# Patient Record
(demographics unavailable — no encounter records)

---

## 2025-03-20 NOTE — HISTORY OF PRESENT ILLNESS
[FreeTextEntry1] : CC: 5 y 8 mo old right handed girl with developmental lags and focal epilepsy. Here for a second opinion.  HPI: Tiana's parents are seeing another opinion. She is being followed at [facility]. To date, Taina has only had one definite unquestionable seizure. The seizure occurred in 2024. She was sleeping and woke up with recurrent bouts of emesis and confusional state. Parents noted that she was "weak on one side, like if she had a stroke" (parents unable to recall which side was weak. The event was very long ("2 to 3 hours long"), so EMS was called. While EMS was assessing the child, a full convulsion was noted. She was taken to [facility] where she was noted to take "about 30 minutes to get back to normal".  A brain MRI was reportedly unrevealing. An initial EEG was "abnormal, lots of activity during sleep". She was started on generic Levetiracetam. A genetic test (parents unsure which test was done) was reportedly unrevealing. Although she has not had definite seizures since 2024, Tiana has had a few episodes of "spacing out and looking weak", including one overt one in 2025, in the setting of acute illness (Strept throat).  For seizure control, she remains on monotherapy with generic Levetiracetam, without side effects. An ambulatory EEG (2024) was reportedly "abnormal, but little bit better". Today's routine EEG ([facility]) revealed abundant spikes over the left posterior quadrant, as well as very frequent spikes over the right posterior quadrant.  In addition to the seizures, Tiana has developmental and speech lags. Her head circumference is borderline low.  She is currently in . Rides the school bus for about 30-40 minutes. Classroom has a 23:2 ratio. She is receiving speech therapy 3 times a week. General health is good, but she has bilateral middle ear effusions and is soon to undergo myringotomies and tubes' placement.  She is not up to date with immunizations. No medication allergies. No surgeries. Sleep is restless. Parents have an audio-video monitor in her bedroom, for safety. Usually sleeps from around 7 PM to around 7 AM.  Current CNS medications: Generic Levetiracetam 2.3 ml BID PRN intrarectal Diazepam as rescue. Never yet needed.   history: Tiana was born at [facility] via VD No  complications No NICU stay  Developmental history: First steps 12 mo First words "on time" Toilet trained "on time"  Family history: Older brother with history of 2 prior "fainting" episodes  Social history: Lives with parents and siblings Goes to school  Past medical history: Microcephaly Developmental delays Speech delays Non lesional focal epilepsy Restless sleep Middle ears effusions  Review of systems: General: No weight loss, weakness or recent fevers. Skin: No rashes, lumps, itching, color change, changes in hair/nails Head: No headaches, no head injury Eyes: No corrective eyeglasse. No discharge Ears: No discharge Nose/Sinuses: No congestion, discharge, itching, epistaxis Mouth/Throat: Normal teeth and gums, no sore throat, hoarseness Neck: No lumps, pain, stiffness Respiratory: No cough, SOB, hemoptysis Cardiac: No edema, chest pain, dyspnea or orthopnea GI: No constipation, bloating or diarrhea : No hematuria, dysuria, urgency or enuresis Musculoskeletal: No joint inflammation or arthralgia Neuro: Seizures. Restless sleep. Developmental lags. Speech delays. Paroxysmal events of unclear nature. Psych: No mood, personality or behavioral concerns.  Physical Exam: HC 48 cm (<3%) Petite non dysmorphic girl in no distress Face is symmetric Neck is supple, no enlarged lymph nodes. Full range of motion. No meningism. No torticollis or webbing No cutaneous stigmata Hair has normal consistency, appearance, distribution Chest is symmetric Good air entry bilaterally. S1 S2 present, no murmur Abdomen soft, non tender, non distended Back has no deformities, no scoliosis, kyphosis or lordosis Awake, alert, good eye contact Speaks in short sentences Follows simple commands well Some symbolic play skills Has joined attention Mimics social behavior Intact extraocular movements Pupils equal and reactive to light No nystagmus Normal muscle tone and bulk   Muscle strength 5/5 in 4 limbs distally and proximally: walks, jumps, climbs, hops once each foot Romberg negative No dysmetria No ataxia No abnormal movements Gait is normal in stride, kirti, and stance.   Tip-toe, heel and forward tandem walk intact DTR 1+ in 4 limbs  Assessment: 5 y 8 mo old right handed girl with developmental delays, speech delays, microcephaly, restless sleep, and non lesional focal epilepsy. Exhibiting unclear seizure control.  Plan: Tiana's visit today had a duration of 60 minutes (>50% of which was spent in direct counseling and coordination of her care). I personally reviewed Tiana's medical history, tests results, recent developments, and then delineated next steps for her neurological care.  Tiana's parents and I reviewed her constellation of neurological symptoms as well as early childhood onset focal epilepsies in general, different treatment modalities, co morbidities and overall prognosis. Epilepsy is a chronic illness with potential for injury that poses a threat to life or bodily function.  We reviewed generic Levetiracetam's side effects profile, seizure action plan, acute management of breakthrough seizures with rescue medications, seizure precautions, medication adherence, common seizure triggers, and the rationale behind monitoring trough levels. Will proceed with testing her anticonvulsant trough level, to further delineated next steps. She will need inpatient video EEG monitoring to make decisions in regard to medication titration/changes, which will be exclusively based on this test's results.  1)	Increase generic Levetiracetam from 2.3 ml BID to 2.5 ml BID, for simplicity 2)	Discontinue PRN intrarectal Diazepam as rescue 3)	PRN intranasal Nayzilam 5 mg as rescue for seizures over 3 minutes 4)	Levetiracetam trough level, CMP, CBC, Ferritin level 5)	Continue using audio-video monitor in her bedroom, for safety 6)	Continue speech therapy 7)	Parents to send me copies of the genetic test report 8)	Same day office visit and routine EEG in 406 weeks  Tiana's parents understand plan, agree and want to move forward. All of their questions were answered. Tiana's controlled substance history was obtained from the [place] state prescription monitoring program registry.  Gudelia Carcamo MD Pediatric Neurologist and Clinical Neurophysiologist Director Pediatric Epilepsy [facility] at [facility] Clinical Professor of Neurology and Pediatrics, [facility] at [facility]

## 2025-06-04 NOTE — HISTORY OF PRESENT ILLNESS
[FreeTextEntry1] : CC: 5 y 11 mo old right handed girl with developmental delays, speech delays, microcephaly, restless sleep, and non lesional focal epilepsy. Here for a follow up visit.  Interval history: Since last seen, Tiana's full medical records were sent over for me to review. 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 Most recent brain MRI (2024) was reportedly unrevealing. Today's routine EEG ([facility]) revealed abundant spikes over the left posterior quadrant, as well as frequent spikes over the right posterior quadrant. No seizures since 2024, while on monotherapy with generic Levetiracetam, without side effects.  Tiana continues to exhibit developmental and speech lags. She is currently in . Rides the school bus for about 30-40 minutes. Classroom has a 23:2 ratio. She is receiving speech therapy 3 times a week. General health is good, but she has bilateral middle ear effusions and is soon to undergo myringotomies and tubes' placement. She is not up to date with immunizations. No medication allergies. No surgeries. Sleep is restless (Ferritin was 19). Parents have an audio-video monitor in her bedroom, for safety. Usually sleeps from around 7 PM to around 7 AM.  Current CNS medications: Generic Levetiracetam 4 ml BID. Most recent trough level 6.9 (while on lower than current doses) PRN intranasal Nayzilam as rescue. Never yet needed.  HPI: I first met Tiana in 3/2025, when parents sought another opinion. She was being followed at [facility]. Tiana has only had one definite unquestionable seizure. The seizure occurred in 2024. She was sleeping and woke up with recurrent bouts of emesis and confusional state. Parents noted that she was "weak on one side, like if she had a stroke" (parents unable to recall which side was weak. The event was very long ("2 to 3 hours long"), so EMS was called. While EMS was assessing the child, a full convulsion was noted. She was taken to [facility] where she was noted to take "about 30 minutes to get back to normal". A brain MRI was reportedly unrevealing. An initial EEG was "abnormal, lots of activity during sleep". She was started on generic Levetiracetam. Although she has not had definite seizures since 2024, Tiana has had a few episodes of "spacing out and looking weak", including one overt one in 2025, in the setting of acute illness (Strept throat). An ambulatory EEG (2024) was reportedly "abnormal, but little bit better". A routine EEG ([facility] 3/2025) revealed abundant spikes over the left posterior quadrant, as well as very frequent spikes over the right posterior quadrant. In addition to the seizures, Tiana has a long standing history of developmental and speech lags. Her head circumference is borderline low. Good general health. Not up to date with immunizations. No medication allergies. No surgeries. Restless sleep.  Prior CNS medications: PRN intrarectal Diazepam as rescue. Never yet needed.   history: Tiana was born at [facility] via VD No  complications No NICU stay  Developmental history: First steps 12 mo First words "on time" Toilet trained "on time"  Family history: Older brother with history of 2 prior "fainting" episodes  Social history: Lives with parents and siblings Goes to school  Past medical history: Microcephaly Developmental delays Speech delays Non lesional focal epilepsy Restless sleep Middle ears effusions Heterozygous STXBP2 mutation  Review of systems: General: No weight loss, weakness or recent fevers. Skin: No rashes, lumps, itching, color change, changes in hair/nails Head: No headaches, no head injury Eyes: No corrective eyeglasses. No discharge Ears: No discharge Nose/Sinuses: No congestion, discharge, itching, epistaxis Mouth/Throat: Normal teeth and gums, no sore throat, hoarseness Neck: No lumps, pain, stiffness Respiratory: No cough, SOB, hemoptysis Cardiac: No edema, chest pain, dyspnea or orthopnea GI: No constipation, bloating or diarrhea : No hematuria, dysuria, urgency or enuresis Musculoskeletal: No joint inflammation or arthralgia Neuro: Seizures. Restless sleep. Developmental lags. Speech delays. Paroxysmal events of unclear nature. Psych: No mood, personality or behavioral concerns.  Physical Exam: HC 48 cm (<3%) Petite non dysmorphic girl in no distress Face is symmetric Neck has full range of motion. No torticollis or webbing No cutaneous stigmata Hair has normal consistency, appearance, distribution Awake, alert, good eye contact Speaks in short sentences Follows simple commands well Some symbolic play skills Has joined attention Mimics social behavior Intact extraocular movements No nystagmus Normal muscle tone and bulk Muscle strength 5/5 in 4 limbs distally and proximally: walks, jumps, climbs, hops once each foot Romberg negative No dysmetria No ataxia No abnormal movements Gait is normal in stride, kirti, and stance. 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 Exhibiting good seizure control.  Plan: Tiana's visit today had a duration of 40 minutes (>50% of which was spent in direct counseling and coordination of her care). I personally reviewed Tiana's medical history, medical records, tests results, recent developments, and then delineated next steps for her neurological care. Tiana's parents and I reviewed her constellation of neurological symptoms as well as early childhood onset focal epilepsies in general, different treatment modalities, co morbidities and overall prognosis. Epilepsy is a chronic illness with potential for injury that poses a threat to life or bodily function. We reviewed generic Levetiracetam's side effects profile, seizure action plan, acute management of breakthrough seizures with rescue medications, seizure precautions, medication adherence, common seizure triggers, and the rationale behind monitoring trough levels. Will proceed with testing her anticonvulsant trough level, to further delineated next steps.   1) Continue generic Levetiracetam 4 ml BID 2) PRN intranasal Nayzilam 5 mg as rescue for seizures over 3 minutes 3) Start Novaferrum 3 ml QD 4) Levetiracetam trough level (due now). Repeat Ferritin level by 2025 5) Continue using audio-video monitor in her bedroom, for safety 6) Continue speech therapy 7) Follow up with  8) Same day office visit and routine EEG in 4 mo  Tiana's parents understand plan, agree and want to move forward. All of their questions were answered. Tiana's controlled substance history was obtained from the [place] state prescription monitoring program registry.  Gudelia Carcamo MD Pediatric Neurologist and Clinical Neurophysiologist Director Pediatric Epilepsy [facility] at [facility] Clinical Professor of Neurology and Pediatrics, [facility] [facility] [employer] at [facility]